# Patient Record
Sex: MALE | Race: BLACK OR AFRICAN AMERICAN | Employment: OTHER | URBAN - METROPOLITAN AREA
[De-identification: names, ages, dates, MRNs, and addresses within clinical notes are randomized per-mention and may not be internally consistent; named-entity substitution may affect disease eponyms.]

---

## 2021-04-25 ENCOUNTER — APPOINTMENT (OUTPATIENT)
Dept: GENERAL RADIOLOGY | Age: 31
End: 2021-04-25
Attending: EMERGENCY MEDICINE

## 2021-04-25 ENCOUNTER — HOSPITAL ENCOUNTER (EMERGENCY)
Age: 31
Discharge: HOME | End: 2021-04-25
Attending: EMERGENCY MEDICINE

## 2021-04-25 VITALS
OXYGEN SATURATION: 99 % | DIASTOLIC BLOOD PRESSURE: 90 MMHG | SYSTOLIC BLOOD PRESSURE: 141 MMHG | RESPIRATION RATE: 18 BRPM | HEIGHT: 67 IN | TEMPERATURE: 98 F | BODY MASS INDEX: 39.24 KG/M2 | HEART RATE: 86 BPM | WEIGHT: 250 LBS

## 2021-04-25 DIAGNOSIS — R53.83 FATIGUE, UNSPECIFIED TYPE: Primary | ICD-10-CM

## 2021-04-25 LAB
ALBUMIN SERPL-MCNC: 4.6 G/DL (ref 3.5–5)
ALBUMIN/GLOB SERPL: 1.2 {RATIO} (ref 1.1–2.2)
ALP SERPL-CCNC: 95 U/L (ref 45–117)
ALT SERPL-CCNC: 90 U/L (ref 12–78)
ANION GAP SERPL CALC-SCNC: 6 MMOL/L (ref 5–15)
AST SERPL W P-5'-P-CCNC: 48 U/L (ref 15–37)
BASOPHILS # BLD: 0 K/UL (ref 0–0.1)
BASOPHILS NFR BLD: 0 % (ref 0–1)
BILIRUB SERPL-MCNC: 0.5 MG/DL (ref 0.2–1)
BUN SERPL-MCNC: 12 MG/DL (ref 6–20)
BUN/CREAT SERPL: 12 (ref 12–20)
CA-I BLD-MCNC: 9.5 MG/DL (ref 8.5–10.1)
CHLORIDE SERPL-SCNC: 101 MMOL/L (ref 97–108)
CO2 SERPL-SCNC: 29 MMOL/L (ref 21–32)
CREAT SERPL-MCNC: 1.02 MG/DL (ref 0.7–1.3)
DIFFERENTIAL METHOD BLD: NORMAL
EOSINOPHIL # BLD: 0 K/UL (ref 0–0.4)
EOSINOPHIL NFR BLD: 0 % (ref 0–7)
ERYTHROCYTE [DISTWIDTH] IN BLOOD BY AUTOMATED COUNT: 13.2 % (ref 11.5–14.5)
GLOBULIN SER CALC-MCNC: 3.8 G/DL (ref 2–4)
GLUCOSE SERPL-MCNC: 112 MG/DL (ref 65–100)
HCT VFR BLD AUTO: 47.2 % (ref 36.6–50.3)
HGB BLD-MCNC: 17 G/DL (ref 12.1–17)
IMM GRANULOCYTES # BLD AUTO: 0 K/UL
IMM GRANULOCYTES NFR BLD AUTO: 0 %
LYMPHOCYTES # BLD: 2.1 K/UL (ref 0.8–3.5)
LYMPHOCYTES NFR BLD: 30 % (ref 12–49)
MCH RBC QN AUTO: 32.3 PG (ref 26–34)
MCHC RBC AUTO-ENTMCNC: 36 G/DL (ref 30–36.5)
MCV RBC AUTO: 89.6 FL (ref 80–99)
MONOCYTES # BLD: 0.4 K/UL (ref 0–1)
MONOCYTES NFR BLD: 5 % (ref 5–13)
NEUTS SEG # BLD: 4.5 K/UL (ref 1.8–8)
NEUTS SEG NFR BLD: 65 % (ref 32–75)
PLATELET # BLD AUTO: 304 K/UL (ref 150–400)
PMV BLD AUTO: 10.3 FL (ref 8.9–12.9)
POTASSIUM SERPL-SCNC: 3.5 MMOL/L (ref 3.5–5.1)
PROT SERPL-MCNC: 8.4 G/DL (ref 6.4–8.2)
RBC # BLD AUTO: 5.27 M/UL (ref 4.1–5.7)
RBC MORPH BLD: NORMAL
SODIUM SERPL-SCNC: 136 MMOL/L (ref 136–145)
TROPONIN I SERPL-MCNC: <0.05 NG/ML
TSH SERPL DL<=0.05 MIU/L-ACNC: 1.43 UIU/ML (ref 0.36–3.74)
WBC # BLD AUTO: 7 K/UL (ref 4.1–11.1)

## 2021-04-25 PROCEDURE — 84443 ASSAY THYROID STIM HORMONE: CPT

## 2021-04-25 PROCEDURE — 36415 COLL VENOUS BLD VENIPUNCTURE: CPT

## 2021-04-25 PROCEDURE — 84484 ASSAY OF TROPONIN QUANT: CPT

## 2021-04-25 PROCEDURE — 99284 EMERGENCY DEPT VISIT MOD MDM: CPT

## 2021-04-25 PROCEDURE — 93005 ELECTROCARDIOGRAM TRACING: CPT

## 2021-04-25 PROCEDURE — 71045 X-RAY EXAM CHEST 1 VIEW: CPT

## 2021-04-25 PROCEDURE — 85025 COMPLETE CBC W/AUTO DIFF WBC: CPT

## 2021-04-25 PROCEDURE — 80053 COMPREHEN METABOLIC PANEL: CPT

## 2021-04-25 PROCEDURE — U0003 INFECTIOUS AGENT DETECTION BY NUCLEIC ACID (DNA OR RNA); SEVERE ACUTE RESPIRATORY SYNDROME CORONAVIRUS 2 (SARS-COV-2) (CORONAVIRUS DISEASE [COVID-19]), AMPLIFIED PROBE TECHNIQUE, MAKING USE OF HIGH THROUGHPUT TECHNOLOGIES AS DESCRIBED BY CMS-2020-01-R: HCPCS

## 2021-04-25 NOTE — LETTER
[address] [place] [address] EMERGENCY DEPT 
[facility] [address] [id number] 
[phone] Work/School Note Date: 4/25/2021 To Whom It May concern: 
 
Joe Tobar was seen and treated today in the emergency room by the following provider(s): 
Attending Provider: Humaira Saldivar MD.   
 
Joe Tobar is excused from work/school on 04/25/21 and 04/26/21. He is medically clear to return to work/school on 4/27/2021. Sincerely, Kelvin Brady MD

## 2021-04-26 LAB
ATRIAL RATE: 85 BPM
CALCULATED P AXIS, ECG09: 44 DEGREES
CALCULATED R AXIS, ECG10: 42 DEGREES
CALCULATED T AXIS, ECG11: 28 DEGREES
DIAGNOSIS, 93000: NORMAL
P-R INTERVAL, ECG05: 138 MS
Q-T INTERVAL, ECG07: 376 MS
QRS DURATION, ECG06: 92 MS
QTC CALCULATION (BEZET), ECG08: 447 MS
SARS-COV-2, COV2: NORMAL
VENTRICULAR RATE, ECG03: 85 BPM

## 2021-04-26 NOTE — ED NOTES
Pt verbalized understanding of discharge instructions. Pt alert and self ambulatory on discharge. Pt given work note due to COVID testing.

## 2021-04-26 NOTE — ED PROVIDER NOTES
EMERGENCY DEPARTMENT HISTORY AND PHYSICAL EXAM      Date: 4/25/2021  Patient Name: Roxy Coleman    History of Presenting Illness     Chief Complaint   Patient presents with    Fatigue       History Provided By: Patient    HPI: Roxy Coleman, 35 y.o. male with a past medical history significant hypertension presents to the ED with chief complaint of Fatigue  . 71-year-old male history of hypertension told he has had obstructive sleep apnea but does not have a CPAP yet. He has been feeling fatigued over the last 3 days. Very tired throughout the day. Not necessarily sleeping less than usual.  Today he was driving home to [place] when he felt tired and wanted to stop. No cough congestion shortness of breath nausea vomiting diarrhea. Just increased fatigue and feeling weak and tired all over. No exposures to Covid that he knows of either. No change in any medications. There are no other complaints, changes, or physical findings at this time. PCP: Yao Miller MD        Past History     Past Medical History:  Past Medical History:   Diagnosis Date    Hypertension        Past Surgical History:  History reviewed. No pertinent surgical history. Family History:  History reviewed. No pertinent family history. Social History:  Social History     Tobacco Use    Smoking status: Never Smoker    Smokeless tobacco: Current User   Substance Use Topics    Alcohol use: Never     Frequency: Never    Drug use: Never     Comment: cbd       Allergies:  No Known Allergies      Review of Systems   Review of Systems   Constitutional: Positive for fatigue. Negative for chills and fever. HENT: Negative. Negative for congestion, ear pain, nosebleeds and sore throat. Eyes: Negative. Negative for pain, discharge and visual disturbance. Respiratory: Negative. Negative for cough, chest tightness and shortness of breath. Cardiovascular: Negative. Negative for chest pain and leg swelling. Gastrointestinal: Negative. Negative for abdominal pain, blood in stool, constipation, diarrhea, nausea and vomiting. Endocrine: Negative. Genitourinary: Negative. Negative for difficulty urinating, dysuria and flank pain. Musculoskeletal: Negative. Negative for back pain and myalgias. Skin: Negative. Negative for rash and wound. Allergic/Immunologic: Negative. Neurological: Positive for weakness. Negative for dizziness, syncope, numbness and headaches. Hematological: Negative. Does not bruise/bleed easily. Psychiatric/Behavioral: Negative. Negative for agitation, confusion, hallucinations and suicidal ideas. All other systems reviewed and are negative. Physical Exam   Physical Exam  Vitals signs and nursing note reviewed. Constitutional:       General: He is not in acute distress. Appearance: He is normal weight. He is not ill-appearing. HENT:      Head: Normocephalic and atraumatic. Right Ear: External ear normal.      Left Ear: External ear normal.      Nose: Nose normal. No rhinorrhea. Mouth/Throat:      Mouth: Mucous membranes are moist.      Pharynx: Oropharynx is clear. Eyes:      Extraocular Movements: Extraocular movements intact. Conjunctiva/sclera: Conjunctivae normal.      Pupils: Pupils are equal, round, and reactive to light. Neck:      Musculoskeletal: Normal range of motion and neck supple. Cardiovascular:      Rate and Rhythm: Normal rate and regular rhythm. Pulses: Normal pulses. Heart sounds: Normal heart sounds. Pulmonary:      Effort: Pulmonary effort is normal. No respiratory distress. Breath sounds: Normal breath sounds. Abdominal:      General: Abdomen is flat. Bowel sounds are normal.      Palpations: Abdomen is soft. Musculoskeletal: Normal range of motion. General: No tenderness or deformity. Skin:     General: Skin is warm and dry. Capillary Refill: Capillary refill takes less than 2 seconds. Findings: No bruising, lesion or rash. Neurological:      General: No focal deficit present. Mental Status: He is alert and oriented to person, place, and time. Mental status is at baseline. Psychiatric:         Mood and Affect: Mood normal.         Behavior: Behavior normal.         Thought Content: Thought content normal.         Judgment: Judgment normal.         Diagnostic Study Results     Labs -     Recent Results (from the past 12 hour(s))   CBC WITH AUTOMATED DIFF    Collection Time: 04/25/21  8:30 PM   Result Value Ref Range    WBC 7.0 4.1 - 11.1 K/uL    RBC 5.27 4. 10 - 5.70 M/uL    HGB 17.0 12.1 - 17.0 g/dL    HCT 47.2 36.6 - 50.3 %    MCV 89.6 80.0 - 99.0 FL    MCH 32.3 26.0 - 34.0 PG    MCHC 36.0 30.0 - 36.5 g/dL    RDW 13.2 11.5 - 14.5 %    PLATELET 440 383 - 116 K/uL    MPV 10.3 8.9 - 12.9 FL    NEUTROPHILS PENDING %    LYMPHOCYTES PENDING %    MONOCYTES PENDING %    EOSINOPHILS PENDING %    BASOPHILS PENDING %    IMMATURE GRANULOCYTES PENDING %    ABS. NEUTROPHILS PENDING K/UL    ABS. LYMPHOCYTES PENDING K/UL    ABS. MONOCYTES PENDING K/UL    ABS. EOSINOPHILS PENDING K/UL    ABS. BASOPHILS PENDING K/UL    ABS. IMM. GRANS. PENDING K/UL    DF PENDING    METABOLIC PANEL, COMPREHENSIVE    Collection Time: 04/25/21  8:30 PM   Result Value Ref Range    Sodium 136 136 - 145 mmol/L    Potassium 3.5 3.5 - 5.1 mmol/L    Chloride 101 97 - 108 mmol/L    CO2 29 21 - 32 mmol/L    Anion gap 6 5 - 15 mmol/L    Glucose 112 (H) 65 - 100 mg/dL    BUN 12 6 - 20 mg/dL    Creatinine 1.02 0.70 - 1.30 mg/dL    BUN/Creatinine ratio 12 12 - 20      GFR est AA >60 >60 ml/min/1.73m2    GFR est non-AA >60 >60 ml/min/1.73m2    Calcium 9.5 8.5 - 10.1 mg/dL    Bilirubin, total 0.5 0.2 - 1.0 mg/dL    AST (SGOT) 48 (H) 15 - 37 U/L    ALT (SGPT) 90 (H) 12 - 78 U/L    Alk.  phosphatase 95 45 - 117 U/L    Protein, total 8.4 (H) 6.4 - 8.2 g/dL    Albumin 4.6 3.5 - 5.0 g/dL    Globulin 3.8 2.0 - 4.0 g/dL    A-G Ratio 1.2 1.1 - 2. 2     TSH 3RD GENERATION    Collection Time: 04/25/21  8:30 PM   Result Value Ref Range    TSH 1.43 0.36 - 3.74 uIU/mL   TROPONIN I    Collection Time: 04/25/21  8:30 PM   Result Value Ref Range    Troponin-I, Qt. <0.05 <0.05 ng/mL       Radiologic Studies -   XR CHEST SNGL V   Final Result   No acute findings. CT Results  (Last 48 hours)    None        CXR Results  (Last 48 hours)               04/25/21 2053  XR CHEST SNGL V Final result    Impression:  No acute findings. Narrative:  AP semiupright portable radiograph of the chest 8:48 PM compared to December 31, 2014. INDICATION: Shortness of breath. Heart size appears stable. No gross infiltrate or effusion. Paratracheal   fullness appears to be vasculature. Airway is patent. Bones appear intact. Medical Decision Making and ED Course   I am the first provider for this patient. I reviewed the vital signs, available nursing notes, past medical history, past surgical history, family history and social history. Vital Signs-Reviewed the patient's vital signs. Patient Vitals for the past 12 hrs:   Temp Pulse Resp BP SpO2   04/25/21 2010 98 °F (36.7 °C) 88 16 (!) 148/91 99 %       EKG interpretation:   EKG at 2027. Normal sinus rhythm rate of 85. No ST changes. No T wave inversions. Normal intervals. Reason rule out dysrhythmia. Interpreted by ER physician. Records Reviewed: Previous Hospital chart. EMS run report      ED Course:   Initial assessment performed. The patients presenting problems have been discussed, and they are in agreement with the care plan formulated and outlined with them. I have encouraged them to ask questions as they arise throughout their visit.     Orders Placed This Encounter    XR CHEST SNGL V     Standing Status:   Standing     Number of Occurrences:   1     Order Specific Question:   Transport     Answer:   BED [2]     Order Specific Question:   Reason for Exam     Answer:   sob  CBC WITH AUTOMATED DIFF     Standing Status:   Standing     Number of Occurrences:   1    METABOLIC PANEL, COMPREHENSIVE     Standing Status:   Standing     Number of Occurrences:   1    TSH 3RD GENERATION     Standing Status:   Standing     Number of Occurrences:   1    SARS-COV-2     Standing Status:   Standing     Number of Occurrences:   1     Order Specific Question:   Specimen source     Answer:   NASOPHARYNGEAL SWAB [650]     Order Specific Question:   Is this test for diagnosis or screening? Answer:   Diagnosis of ill patient     Order Specific Question:   Symptomatic for COVID-19 as defined by CDC? Answer:   Yes     Order Specific Question:   Date of Symptom Onset     Answer:   4/22/2021     Order Specific Question:   Hospitalized for COVID-19? Answer:   No     Order Specific Question:   Admitted to ICU for COVID-19? Answer:   No     Order Specific Question:   Employed in healthcare setting? Answer:   No     Order Specific Question:   Resident in a congregate (group) care setting? Answer:   No     Order Specific Question:   Previously tested for COVID-19? Answer:   Unknown    TROPONIN I     Standing Status:   Standing     Number of Occurrences:   1    EKG 12 LEAD INITIAL     Standing Status:   Standing     Number of Occurrences:   1     Order Specific Question:   Reason for Exam:     Answer:   sob              CONSULTANTS:  Consults      Provider Notes (Medical Decision Making):   64-year-old with increased fatigue differential includes sleep disturbances versus infection versus Covid versus anemia. Plan for screening test.    Unremarkable screening test.  Covid swab sent. Vitals are stable patient in no distress. Recommend PCP follow-up. for cpap      Procedures                       Disposition       Emergency Department Disposition:  dc      Diagnosis     Clinical Impression:   1.  Fatigue, unspecified type        Attestations:    Cindi Bryson MD    Please note that this dictation was completed with [employer], the computer voice recognition software. Quite often unanticipated grammatical, syntax, homophones, and other interpretive errors are inadvertently transcribed by the computer software. Please disregard these errors. Please excuse any errors that have escaped final proofreading. Thank you.

## 2021-04-26 NOTE — DISCHARGE INSTRUCTIONS
Thank you! Thank you for allowing me to care for you in the emergency department. I sincerely hope that you are satisfied with your visit today. It is my goal to provide you with excellent care. Below you will find a list of your labs and imaging from your visit today. Should you have any questions regarding these results please do not hesitate to call the emergency department. Labs -     Recent Results (from the past 12 hour(s))   CBC WITH AUTOMATED DIFF    Collection Time: 04/25/21  8:30 PM   Result Value Ref Range    WBC 7.0 4.1 - 11.1 K/uL    RBC 5.27 4. 10 - 5.70 M/uL    HGB 17.0 12.1 - 17.0 g/dL    HCT 47.2 36.6 - 50.3 %    MCV 89.6 80.0 - 99.0 FL    MCH 32.3 26.0 - 34.0 PG    MCHC 36.0 30.0 - 36.5 g/dL    RDW 13.2 11.5 - 14.5 %    PLATELET 848 433 - 350 K/uL    MPV 10.3 8.9 - 12.9 FL    NEUTROPHILS PENDING %    LYMPHOCYTES PENDING %    MONOCYTES PENDING %    EOSINOPHILS PENDING %    BASOPHILS PENDING %    IMMATURE GRANULOCYTES PENDING %    ABS. NEUTROPHILS PENDING K/UL    ABS. LYMPHOCYTES PENDING K/UL    ABS. MONOCYTES PENDING K/UL    ABS. EOSINOPHILS PENDING K/UL    ABS. BASOPHILS PENDING K/UL    ABS. IMM. GRANS. PENDING K/UL    DF PENDING    METABOLIC PANEL, COMPREHENSIVE    Collection Time: 04/25/21  8:30 PM   Result Value Ref Range    Sodium 136 136 - 145 mmol/L    Potassium 3.5 3.5 - 5.1 mmol/L    Chloride 101 97 - 108 mmol/L    CO2 29 21 - 32 mmol/L    Anion gap 6 5 - 15 mmol/L    Glucose 112 (H) 65 - 100 mg/dL    BUN 12 6 - 20 mg/dL    Creatinine 1.02 0.70 - 1.30 mg/dL    BUN/Creatinine ratio 12 12 - 20      GFR est AA >60 >60 ml/min/1.73m2    GFR est non-AA >60 >60 ml/min/1.73m2    Calcium 9.5 8.5 - 10.1 mg/dL    Bilirubin, total 0.5 0.2 - 1.0 mg/dL    AST (SGOT) 48 (H) 15 - 37 U/L    ALT (SGPT) 90 (H) 12 - 78 U/L    Alk.  phosphatase 95 45 - 117 U/L    Protein, total 8.4 (H) 6.4 - 8.2 g/dL    Albumin 4.6 3.5 - 5.0 g/dL    Globulin 3.8 2.0 - 4.0 g/dL    A-G Ratio 1.2 1.1 - 2.2     TSH 3RD GENERATION    Collection Time: 04/25/21  8:30 PM   Result Value Ref Range    TSH 1.43 0.36 - 3.74 uIU/mL   TROPONIN I    Collection Time: 04/25/21  8:30 PM   Result Value Ref Range    Troponin-I, Qt. <0.05 <0.05 ng/mL       Radiologic Studies -   XR CHEST SNGL V   Final Result   No acute findings. CT Results  (Last 48 hours)      None          CXR Results  (Last 48 hours)                 04/25/21 2053  XR CHEST SNGL V Final result    Impression:  No acute findings. Narrative:  AP semiupright portable radiograph of the chest 8:48 PM compared to December 31, 2014. INDICATION: Shortness of breath. Heart size appears stable. No gross infiltrate or effusion. Paratracheal   fullness appears to be vasculature. Airway is patent. Bones appear intact. If you feel that you have not received excellent quality care or timely care, please ask to speak to the nurse manager. Please choose us in the future for your continued health care needs. ------------------------------------------------------------------------------------------------------------  The exam and treatment you received in the Emergency Department were for an urgent problem and are not intended as complete care. It is important that you follow-up with a doctor, nurse practitioner, or physician assistant to:  (1) confirm your diagnosis,  (2) re-evaluation of changes in your illness and treatment, and  (3) for ongoing care. If your symptoms become worse or you do not improve as expected and you are unable to reach your usual health care provider, you should return to the Emergency Department. We are available 24 hours a day. Please take your discharge instructions with you when you go to your follow-up appointment. If you have any problem arranging a follow-up appointment, contact the Emergency Department immediately.     If a prescription has been provided, please have it filled as soon as possible to prevent a delay in treatment. Read the entire medication instruction sheet provided to you by the pharmacy. If you have any questions or reservations about taking the medication due to side effects or interactions with other medications, please call your primary care physician or contact the ER to speak with the charge nurse. Make an appointment with your family doctor or the physician you were referred to for follow-up of this visit as instructed on your discharge paperwork, as this is a mandatory follow-up. Return to the ER if you are unable to be seen or if you are unable to be seen in a timely manner. If you have any problem arranging the follow-up visit, contact the Emergency Department immediately.

## 2021-04-27 LAB — SARS-COV-2, COV2NT: NOT DETECTED

## 2022-03-21 ENCOUNTER — OFFICE VISIT (OUTPATIENT)
Dept: ORTHOPEDIC SURGERY | Age: 32
End: 2022-03-21
Payer: MEDICAID

## 2022-03-21 VITALS — BODY MASS INDEX: 39.24 KG/M2 | HEIGHT: 67 IN | WEIGHT: 250 LBS

## 2022-03-21 DIAGNOSIS — Q68.6 DISCOID LATERAL MENISCUS OF RIGHT KNEE: ICD-10-CM

## 2022-03-21 DIAGNOSIS — M25.561 RIGHT KNEE PAIN, UNSPECIFIED CHRONICITY: Primary | ICD-10-CM

## 2022-03-21 DIAGNOSIS — S83.281A ACUTE LATERAL MENISCUS TEAR OF RIGHT KNEE, INITIAL ENCOUNTER: ICD-10-CM

## 2022-03-21 PROCEDURE — 99203 OFFICE O/P NEW LOW 30 MIN: CPT | Performed by: ORTHOPAEDIC SURGERY

## 2022-03-21 RX ORDER — AMLODIPINE BESYLATE AND ATORVASTATIN CALCIUM 10; 10 MG/1; MG/1
1 TABLET, FILM COATED ORAL DAILY
COMMUNITY

## 2022-03-21 RX ORDER — SERTRALINE HYDROCHLORIDE 50 MG/1
TABLET, FILM COATED ORAL DAILY
COMMUNITY

## 2022-03-21 RX ORDER — WARFARIN 7.5 MG/1
7.5 TABLET ORAL DAILY
COMMUNITY

## 2022-03-21 RX ORDER — LOSARTAN POTASSIUM 100 MG/1
100 TABLET ORAL DAILY
COMMUNITY

## 2022-03-21 RX ORDER — METOPROLOL SUCCINATE 50 MG/1
TABLET, EXTENDED RELEASE ORAL DAILY
COMMUNITY

## 2022-03-21 NOTE — LETTER
3/21/2022    Patient: Tammy Zaman   YOB: 1990   Date of Visit: 3/21/2022     Jose Edmondson, [address] [address]  Via Fax: [phone]    Dear Jose Edmondson MD,      Thank you for referring Mr. Tammy Zaman to [facility] for evaluation. My notes for this consultation are attached. If you have questions, please do not hesitate to call me. I look forward to following your patient along with you.       Sincerely,    Meño Luu, DO

## 2022-03-21 NOTE — PATIENT INSTRUCTIONS
What to expect after Knee Arthroscopy   Dr. Anthony Palmer should not have ANYTHING to eat or drink after midnight the night before your surgery.  This includes NO gum, mints, candy, lifesavers or lollipops!  Please make sure to remove ALL jewelry.  When you arrive at the hospital or surgery center, you will be checked in and given an IV.  When you wake up, your knee will have pain medicine inside of it. This will provide you with pain relief for the first day. Even though your knee feels good, DO NOT over exert yourself during this period!!! You will regret it as it will hurt worse when the numbing medicine wears off!  You should not need crutches, but if you feel they are necessary, ask for them before you leave the hospital or surgery center.  During first three days, you should RELAX, ice and elevate the knee. You may do some walking, but keep it to a minimum! The pain is usually the worst when the pain medicine wears off, and then gradually subsides after that.  Numbness, tingling, soreness and bruising are all normal.   Your knee may also be warm to touch for the first few days.  You may also experience swelling in the leg, ankle and foot. This is normal.   I recommend ice and elevation   You will likely continue to have pain for several weeks or even months.  Recovery from knee surgery could take several months. BE PATIENT!  All you need to do for the first two weeks is to slowly increase your walking.  An exception to the above: Meniscus Repairs  o Patients with meniscus repairs will be placed in a hinged knee brace that is locked to keep the leg straight. You may weight bear in this brace.   o At your first follow up visit, you will have your sutures removed     You may be given a script for physical therapy depending on the extent of your surgery  o You will be in therapy for about 4 weeks  6 weeks or as needed   Driving:  o If you had surgery on your  - LEFT knee, you can begin driving when you are no longer taking narcotic pain medications. - If you had surgery on your RIGHT knee, you should figure on starting to drive when the knee is strong enough to press the brake in an emergency and you are off pain medicines.   - If you had a meniscal repair, this will be 6 weeks post-op  -    Your restrictions will be as follows:  o NO lifting / carrying / pushing / pulling greater than 10 lbs for 2 weeks  o NO kneeling / crawling / climbing for 2 weeks  o

## 2022-03-21 NOTE — PROGRESS NOTES
Amena Mcgowan (: 1990) is a 28 y.o. male, new patient, here for evaluation of the following chief complaint(s):  Knee Pain (right)       ASSESSMENT/PLAN:  Below is the assessment and plan developed based on review of pertinent history, physical exam, labs, studies, and medications. We were able to review his MRI performed at an outside facility. Based on his MRI and his continued symptoms we discussed the possibility of right knee arthroscopy with partial lateral meniscectomy and microfracture procedure. We did discuss osteochondral allograft procedures but based on the nondisplaced nature of his osteochondral defect he would like to plan for microfracture instead as this would be a less invasive surgery. Risks and benefits of surgical treatment were explained. We discussed risks of infection, blood loss, neurovascular injury, anesthesia risks, and risk secondary to patient comorbidities. Risk of continued knee pain/instability was explained. We discussed that implants may need to be used for this procedure. We discussed that there may be need for future surgical procedures. Patient understands the risks of this procedure and elects to schedule in the near future. We will have to coordinate with his primary care in regards to holding his blood thinner prior to surgery and possibly bridging with Lovenox. 1. Right knee pain, unspecified chronicity  -     XR KNEE RT MIN 4 V; Future  2. Acute lateral meniscus tear of right knee, initial encounter      Return for After imaging study. SUBJECTIVE/OBJECTIVE:  Amena Mcgowan (: 1990) is a 28 y.o. male. He notes aching pain and recurrent swelling in the right knee. He has tried conservative treatment over the last 5 months and has seen 2 outside orthopedics for this. He notes that he has to limit his anti-inflammatory intake as he is on Coumadin for history of blood clots. He describes occasional sharp pains and buckling symptoms. He notes that he ambulates with a crutch most of the time. No Known Allergies    Current Outpatient Medications   Medication Sig    metoprolol succinate (TOPROL-XL) 50 mg XL tablet Take  by mouth daily.  losartan (COZAAR) 100 mg tablet Take 100 mg by mouth daily.  amLODIPine-atorvastatin (CADUET) 10-10 mg per tablet Take 1 Tablet by mouth daily.  sertraline (ZOLOFT) 50 mg tablet Take  by mouth daily.  warfarin (Coumadin) 7.5 mg tablet Take 7.5 mg by mouth daily. No current facility-administered medications for this visit. Social History     Socioeconomic History    Marital status: SINGLE     Spouse name: Not on file    Number of children: Not on file    Years of education: Not on file    Highest education level: Not on file   Occupational History    Not on file   Tobacco Use    Smoking status: Never Smoker    Smokeless tobacco: Current User   Substance and Sexual Activity    Alcohol use: Never    Drug use: Never     Comment: cbd    Sexual activity: Not on file   Other Topics Concern    Not on file   Social History Narrative    Not on file     Social Determinants of Health     Financial Resource Strain:     Difficulty of Paying Living Expenses: Not on file   Food Insecurity:     Worried About Running Out of Food in the Last Year: Not on file    Rudy of Food in the Last Year: Not on file   Transportation Needs:     Lack of Transportation (Medical): Not on file    Lack of Transportation (Non-Medical):  Not on file   Physical Activity:     Days of Exercise per Week: Not on file    Minutes of Exercise per Session: Not on file   Stress:     Feeling of Stress : Not on file   Social Connections:     Frequency of Communication with Friends and Family: Not on file    Frequency of Social Gatherings with Friends and Family: Not on file    Attends Druze Services: Not on file    Active Member of Clubs or Organizations: Not on file    Attends Club or Organization Meetings: Not on file    Marital Status: Not on file   Intimate Partner Violence:     Fear of Current or Ex-Partner: Not on file    Emotionally Abused: Not on file    Physically Abused: Not on file    Sexually Abused: Not on file   Housing Stability:     Unable to Pay for Housing in the Last Year: Not on file    Number of [place] in the Last Year: Not on file    Unstable Housing in the Last Year: Not on file       History reviewed. No pertinent surgical history. History reviewed. No pertinent family history. REVIEW OF SYSTEMS:  ROS     Positive for: Musculoskeletal    Last edited by Bre López on 3/21/2022 10:29 AM. (History)        Patient denies any recent fever, chills, nausea, vomiting, chest pain, or shortness of breath. Vitals:  Ht 5' 7\" (1.702 m)   Wt 250 lb (113.4 kg)   BMI 39.16 kg/m²    Body mass index is 39.16 kg/m². PHYSICAL EXAM:  General exam: Patient is awake, alert, and oriented x3. Well-appearing. No acute distress. Ambulates with an antalgic gait    Right knee: Neurovascular and sensory intact. There is tenderness to palpation along the lateral joint line. Mild effusion is present. There is pain with Leelee's maneuver. No obvious instability on ligamentous testing including Lachman's exam.  Stable anterior and posterior drawer testing. No erythema or ecchymosis. IMAGING:  Previous MRI of the right knee from an outside facility was reviewed and shows evidence of complex tear of the anterior lateral meniscus. There is a degenerative signal change in the posterior horn of the medial meniscus. Small joint effusion is present. 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 No displacement of the osteochondral fragment is noted.   The osteochondral defect measures 25.5 x 15.5 x 6.5 mm in size    XR Results (most recent):  Results from Appointment encounter on 03/21/22    XR KNEE RT MIN 4 V    Narrative  X-rays of the right knee 4 views done today show evidence of maintained joint space. There is some squaring of the lateral femoral condyle. Normal patellofemoral joint space and alignment. Results from [place] encounter on 04/25/21    XR CHEST SNGL V    Narrative  AP semiupright portable radiograph of the chest 8:48 PM compared to December 31, 2014. INDICATION: Shortness of breath. Heart size appears stable. No gross infiltrate or effusion. Paratracheal  fullness appears to be vasculature. Airway is patent. Bones appear intact. Impression  No acute findings. Orders Placed This Encounter    XR KNEE RT MIN 4 V     5     Standing Status:   Future     Number of Occurrences:   1     Standing Expiration Date:   9/21/2022              An electronic signature was used to authenticate this note.   -- Lenin Willson, DO

## 2022-03-24 DIAGNOSIS — Q68.6 DISCOID LATERAL MENISCUS OF RIGHT KNEE: ICD-10-CM

## 2022-03-24 DIAGNOSIS — S83.281A ACUTE LATERAL MENISCUS TEAR OF RIGHT KNEE, INITIAL ENCOUNTER: Primary | ICD-10-CM

## 2022-05-17 DIAGNOSIS — Z98.890 STATUS POST ARTHROSCOPY OF KNEE: Primary | ICD-10-CM

## 2022-05-17 RX ORDER — OXYCODONE HYDROCHLORIDE 5 MG/1
5 TABLET ORAL
Qty: 28 TABLET | Refills: 0 | Status: SHIPPED | OUTPATIENT
Start: 2022-05-17 | End: 2022-05-24

## 2022-05-25 ENCOUNTER — OFFICE VISIT (OUTPATIENT)
Dept: ORTHOPEDIC SURGERY | Age: 32
End: 2022-05-25
Payer: MEDICAID

## 2022-05-25 VITALS — HEIGHT: 67 IN | WEIGHT: 250 LBS | BODY MASS INDEX: 39.24 KG/M2

## 2022-05-25 DIAGNOSIS — M25.561 RIGHT KNEE PAIN, UNSPECIFIED CHRONICITY: Primary | ICD-10-CM

## 2022-05-25 DIAGNOSIS — M95.8 OSTEOCHONDRAL DEFECT OF CONDYLE OF FEMUR: ICD-10-CM

## 2022-05-25 PROCEDURE — 99024 POSTOP FOLLOW-UP VISIT: CPT | Performed by: ORTHOPAEDIC SURGERY

## 2022-05-25 NOTE — PROGRESS NOTES
Merary Wood (: 1990) is a 28 y.o. male, established patient, here for evaluation of the following chief complaint(s):  Post OP Follow Up and Knee Pain       ASSESSMENT/PLAN:  Below is the assessment and plan developed based on review of pertinent history, physical exam, labs, studies, and medications. We discussed his operative findings and large osteochondral defect. We discussed the possibility of future procedures such as osteochondral allograft transplant for his defect. We will start a therapy regimen and he will remain partial weightbearing for the first few weeks and then progress to weightbearing as tolerated. He will start home exercise regimen and I will plan to see him back in 4 weeks. 1. Right knee pain, unspecified chronicity  -     REFERRAL TO PHYSICAL THERAPY  2. Osteochondral defect of condyle of femur  -     REFERRAL TO PHYSICAL THERAPY      Return in about 4 weeks (around 2022). SUBJECTIVE/OBJECTIVE:  Merary Wood (: 1990) is a 28 y.o. male. He notes some continued aching pain in the right knee status post right knee arthroscopy with microfracture procedure for 2 cm diameter osteochondral defect of the weightbearing surface of the lateral femoral condyle. No Known Allergies    Current Outpatient Medications   Medication Sig    metoprolol succinate (TOPROL-XL) 50 mg XL tablet Take  by mouth daily.  losartan (COZAAR) 100 mg tablet Take 100 mg by mouth daily.  amLODIPine-atorvastatin (CADUET) 10-10 mg per tablet Take 1 Tablet by mouth daily.  sertraline (ZOLOFT) 50 mg tablet Take  by mouth daily.  warfarin (Coumadin) 7.5 mg tablet Take 7.5 mg by mouth daily. No current facility-administered medications for this visit.        Social History     Socioeconomic History    Marital status: SINGLE     Spouse name: Not on file    Number of children: Not on file    Years of education: Not on file    Highest education level: Not on file Occupational History    Not on file   Tobacco Use    Smoking status: Never Smoker    Smokeless tobacco: Current User   Substance and Sexual Activity    Alcohol use: Never    Drug use: Never     Comment: cbd    Sexual activity: Not on file   Other Topics Concern    Not on file   Social History Narrative    Not on file     Social Determinants of Health     Financial Resource Strain:     Difficulty of Paying Living Expenses: Not on file   Food Insecurity:     Worried About Running Out of Food in the Last Year: Not on file    Rudy of Food in the Last Year: Not on file   Transportation Needs:     Lack of Transportation (Medical): Not on file    Lack of Transportation (Non-Medical): Not on file   Physical Activity:     Days of Exercise per Week: Not on file    Minutes of Exercise per Session: Not on file   Stress:     Feeling of Stress : Not on file   Social Connections:     Frequency of Communication with Friends and Family: Not on file    Frequency of Social Gatherings with Friends and Family: Not on file    Attends Scientologist Services: Not on file    Active Member of [address] [place] [address] or Organizations: Not on file    Attends Club or Organization Meetings: Not on file    Marital Status: Not on file   Intimate Partner Violence:     Fear of Current or Ex-Partner: Not on file    Emotionally Abused: Not on file    Physically Abused: Not on file    Sexually Abused: Not on file   Housing Stability:     Unable to Pay for Housing in the Last Year: Not on file    Number of [place] in the Last Year: Not on file    Unstable Housing in the Last Year: Not on file       No past surgical history on file. No family history on file. REVIEW OF SYSTEMS:  ROS     Positive for: Musculoskeletal    Last edited by Ora Nava on 5/25/2022 10:24 AM. (History)        Patient denies any recent fever, chills, nausea, vomiting, chest pain, or shortness of breath.       Vitals:  Ht 5' 7\" (1.702 m)   Wt 250 lb (113.4 kg)   BMI 39.16 kg/m²    Body mass index is 39.16 kg/m². PHYSICAL EXAM:  General exam: Patient is awake, alert, and oriented x3. Well-appearing. No acute distress. Ambulates with an antalgic gait    Right knee: Neurovascular and sensory intact. Incisions well-healed with no signs of erythema or drainage. Mild swelling and ecchymosis. There is some limitation in range of motion due to swelling. Normal stability. IMAGING:    XR Results (most recent):  Results from Appointment encounter on 03/21/22    XR KNEE RT MIN 4 V    Narrative  X-rays of the right knee 4 views done today show evidence of maintained joint space. There is some squaring of the lateral femoral condyle. Normal patellofemoral joint space and alignment. Results from [place] encounter on 04/25/21    XR CHEST SNGL V    Narrative  AP semiupright portable radiograph of the chest 8:48 PM compared to December 31, 2014. INDICATION: Shortness of breath. Heart size appears stable. No gross infiltrate or effusion. Paratracheal  fullness appears to be vasculature. Airway is patent. Bones appear intact. Impression  No acute findings. Orders Placed This Encounter    REFERRAL TO PHYSICAL THERAPY     Referral Priority:   Routine     Referral Type:   PT/OT/ST     Referral Reason:   Specialty Services Required     Number of Visits Requested:   1              An electronic signature was used to authenticate this note.   -- Darcie Bergman DO

## 2022-09-09 ENCOUNTER — OFFICE VISIT (OUTPATIENT)
Dept: ORTHOPEDIC SURGERY | Age: 32
End: 2022-09-09
Payer: MEDICAID

## 2022-09-09 VITALS — HEIGHT: 67 IN | WEIGHT: 250 LBS | BODY MASS INDEX: 39.24 KG/M2

## 2022-09-09 DIAGNOSIS — M25.561 RIGHT KNEE PAIN, UNSPECIFIED CHRONICITY: ICD-10-CM

## 2022-09-09 DIAGNOSIS — M95.8 OSTEOCHONDRAL DEFECT OF CONDYLE OF FEMUR: Primary | ICD-10-CM

## 2022-09-09 PROCEDURE — 99212 OFFICE O/P EST SF 10 MIN: CPT | Performed by: ORTHOPAEDIC SURGERY

## 2022-09-09 RX ORDER — METHYLPREDNISOLONE 4 MG/1
TABLET ORAL
Qty: 1 DOSE PACK | Refills: 0 | Status: SHIPPED | OUTPATIENT
Start: 2022-09-09

## 2022-09-09 NOTE — PROGRESS NOTES
Alex Gold (: 1990) is a 28 y.o. male, established patient, here for evaluation of the following chief complaint(s):  Knee Pain       ASSESSMENT/PLAN:  Below is the assessment and plan developed based on review of pertinent history, physical exam, labs, studies, and medications. Claudio Farias seem to be progressing well since his microfracture procedure. We did discuss once again the possibility of osteochondral allograft transplantation. However, we would like to hold off if possible. I will see him back on an as-needed basis as he returns to normal work duties    1. Osteochondral defect of condyle of femur  2. Right knee pain, unspecified chronicity    Return if symptoms worsen or fail to improve. SUBJECTIVE/OBJECTIVE:  Alex Gold (: 1990) is a 28 y.o. male. He notes that overall he has progressed well since his right knee arthroscopy with microfracture procedure for his osteochondral allograft of the lateral femoral condyle. He notes occasional aching pain with increased activity        No Known Allergies    Current Outpatient Medications   Medication Sig    metoprolol succinate (TOPROL-XL) 50 mg XL tablet Take  by mouth daily. losartan (COZAAR) 100 mg tablet Take 100 mg by mouth daily. amLODIPine-atorvastatin (CADUET) 10-10 mg per tablet Take 1 Tablet by mouth daily. sertraline (ZOLOFT) 50 mg tablet Take  by mouth daily. warfarin (Coumadin) 7.5 mg tablet Take 7.5 mg by mouth daily. No current facility-administered medications for this visit.        Social History     Socioeconomic History    Marital status: SINGLE     Spouse name: Not on file    Number of children: Not on file    Years of education: Not on file    Highest education level: Not on file   Occupational History    Not on file   Tobacco Use    Smoking status: Never    Smokeless tobacco: Current   Substance and Sexual Activity    Alcohol use: Never    Drug use: Never     Comment: cbd    Sexual activity: Not on file   Other Topics Concern    Not on file   Social History Narrative    Not on file     Social Determinants of Health     Financial Resource Strain: Not on file   Food Insecurity: Not on file   Transportation Needs: Not on file   Physical Activity: Not on file   Stress: Not on file   Social Connections: Not on file   Intimate Partner Violence: Not on file   Housing Stability: Not on file       No past surgical history on file. No family history on file. REVIEW OF SYSTEMS:    Patient denies any recent fever, chills, nausea, vomiting, chest pain, or shortness of breath. Vitals:  Ht 5' 7\" (1.702 m)   Wt 250 lb (113.4 kg)   BMI 39.16 kg/m²    Body mass index is 39.16 kg/m². PHYSICAL EXAM:  General exam: Patient is awake, alert, and oriented x3. Well-appearing. No acute distress. Ambulates with a normal gait. Right knee: No significant tenderness palpation is noted. No effusion. Minimal crepitus with range of motion. Normal stability    IMAGING:    XR Results (most recent):  Results from Appointment encounter on 03/21/22    XR KNEE RT MIN 4 V    Narrative  X-rays of the right knee 4 views done today show evidence of maintained joint space. There is some squaring of the lateral femoral condyle. Normal patellofemoral joint space and alignment. Results from [place] encounter on 04/25/21    XR CHEST SNGL V    Narrative  AP semiupright portable radiograph of the chest 8:48 PM compared to December 31, 2014. INDICATION: Shortness of breath. Heart size appears stable. No gross infiltrate or effusion. Paratracheal  fullness appears to be vasculature. Airway is patent. Bones appear intact. Impression  No acute findings. No orders of the defined types were placed in this encounter. An electronic signature was used to authenticate this note.   -- Rosibel Sans, DO

## 2022-09-09 NOTE — LETTER
9/9/2022    Patient: Camryn Andrews   YOB: 1990   Date of Visit: 9/9/2022     Florence Lawrence, [address]  [place] [id number]  Via Fax: [phone]    Dear Florence Lawrence MD,      Thank you for referring Mr. Camryn Andrews to [facility] for evaluation. My notes for this consultation are attached. If you have questions, please do not hesitate to call me. I look forward to following your patient along with you.       Sincerely,    Kriss Camargo, DO